# Patient Record
Sex: FEMALE | Race: BLACK OR AFRICAN AMERICAN | NOT HISPANIC OR LATINO | Employment: UNEMPLOYED | URBAN - METROPOLITAN AREA
[De-identification: names, ages, dates, MRNs, and addresses within clinical notes are randomized per-mention and may not be internally consistent; named-entity substitution may affect disease eponyms.]

---

## 2018-08-27 ENCOUNTER — OFFICE VISIT (OUTPATIENT)
Dept: ENDOCRINOLOGY | Facility: CLINIC | Age: 68
End: 2018-08-27

## 2018-08-27 VITALS
HEART RATE: 100 BPM | OXYGEN SATURATION: 98 % | DIASTOLIC BLOOD PRESSURE: 64 MMHG | SYSTOLIC BLOOD PRESSURE: 100 MMHG | WEIGHT: 77.31 LBS

## 2018-08-27 DIAGNOSIS — E11.65 UNCONTROLLED TYPE 2 DIABETES MELLITUS WITH HYPERGLYCEMIA, WITH LONG-TERM CURRENT USE OF INSULIN (HCC): ICD-10-CM

## 2018-08-27 DIAGNOSIS — Z79.4 UNCONTROLLED TYPE 2 DIABETES MELLITUS WITH HYPERGLYCEMIA, WITH LONG-TERM CURRENT USE OF INSULIN (HCC): ICD-10-CM

## 2018-08-27 DIAGNOSIS — E11.65 UNCONTROLLED TYPE 2 DIABETES MELLITUS WITH HYPERGLYCEMIA, WITH LONG-TERM CURRENT USE OF INSULIN (HCC): Primary | ICD-10-CM

## 2018-08-27 DIAGNOSIS — Z79.4 UNCONTROLLED TYPE 2 DIABETES MELLITUS WITH HYPERGLYCEMIA, WITH LONG-TERM CURRENT USE OF INSULIN (HCC): Primary | ICD-10-CM

## 2018-08-27 DIAGNOSIS — K52.9 CHRONIC DIARRHEA: ICD-10-CM

## 2018-08-27 DIAGNOSIS — D64.9 ANEMIA, UNSPECIFIED TYPE: ICD-10-CM

## 2018-08-27 LAB
GLUCOSE BLDC GLUCOMTR-MCNC: 63 MG/DL (ref 70–130)
HBA1C MFR BLD: 10.9 %

## 2018-08-27 PROCEDURE — 83036 HEMOGLOBIN GLYCOSYLATED A1C: CPT | Performed by: PHYSICIAN ASSISTANT

## 2018-08-27 PROCEDURE — 82570 ASSAY OF URINE CREATININE: CPT | Performed by: PHYSICIAN ASSISTANT

## 2018-08-27 PROCEDURE — 82043 UR ALBUMIN QUANTITATIVE: CPT | Performed by: PHYSICIAN ASSISTANT

## 2018-08-27 PROCEDURE — 99204 OFFICE O/P NEW MOD 45 MIN: CPT | Performed by: PHYSICIAN ASSISTANT

## 2018-08-27 PROCEDURE — 82947 ASSAY GLUCOSE BLOOD QUANT: CPT | Performed by: PHYSICIAN ASSISTANT

## 2018-08-27 RX ORDER — LOPERAMIDE HYDROCHLORIDE 2 MG/1
CAPSULE ORAL
Qty: 90 CAPSULE | Refills: 0 | Status: SHIPPED | OUTPATIENT
Start: 2018-08-27

## 2018-08-27 NOTE — PROGRESS NOTES
Chief Complaint  Establish care for Diabetes Mellitus.    HPI   Nadia Jean Baptiste is a 67 y.o. female who is here today for evaluation of Diabetes Mellitus type 2.  The initial diagnosis of diabetes was made 2012.    Presents with daughter today.   Needs new PCP.     A1C- 10.9 (8/27/18), 13.8 (6/29/18)  BS 63 today. Fasting today.   Daily diarrhea after eating. Chronic x 1 year. Takes Imodium. Mag oxide on med list but she's not taking this. Not on metformin. Good appetite. Hx porcelain gallbladder. Elevated LFTs.   Anemic. Denies melena or hematochezia.   Labs reviewed:  6/29/18- Hgb 10.5, Hct 30.6, GFR >60, , , LDL 44, TG 70, TSH 1.86    Diabetic complications: peripheral neuropathy- tingling and numbness in fingers, none in feet  Eye exam current (within one year): due  Foot care and dental care: discussed    Current diabetic medications include:  Novolin R - if morning sugar >500 takes 10U, if over 200 then takes 5U  Toujeo 10U QHS    Statin: no    Past medications: Levemir (hypoglycemia)    Diabetic Monitoring  - checks glucosex/day  Glucose is averaging- per report- morning sugar 300s, pre-lunch 200s, pre-dinner 300-400s  Hypoglycemia- not recently since on toujeo. Had hypoglycemia on levemir  No meter or log for review    Nutrition:     Current diet: on average, 2-3 meals per day. No sugary drinks  Current exercise: none  Seen RD in past: no    The following portions of the patient's history were reviewed and updated by me as appropriate: allergies, current medications, past family history, past social history, past surgical history and problem list.    No past medical history on file.    Medications    Current Outpatient Prescriptions:   •  glucose blood (RELION PRIME TEST) test strip, Use QID, Disp: 150 each, Rfl: 12  •  Insulin Glargine (TOUJEO SOLOSTAR) 300 UNIT/ML solution pen-injector, Inject 20 Units under the skin into the appropriate area as directed every night at bedtime., Disp: 2 pen,  Rfl: 1  •  insulin regular (HUMULIN R) 100 UNIT/ML injection, Take up to 20 units ac BID, Disp: 20 mL, Rfl: 6  •  loperamide (IMODIUM) 2 MG capsule, Take TID prn diarrhea, Disp: 90 capsule, Rfl: 0    Review of Systems  Review of Systems   Constitutional: Positive for unexpected weight change (wt loss). Negative for chills, fatigue and fever.   HENT: Negative for ear pain, hearing loss, nosebleeds, rhinorrhea and sore throat.    Eyes: Negative for pain, discharge, redness, itching and visual disturbance.   Respiratory: Negative for cough, shortness of breath and wheezing.    Cardiovascular: Negative for chest pain, palpitations and leg swelling.   Gastrointestinal: Positive for diarrhea. Negative for abdominal pain, anal bleeding, blood in stool and constipation.   Endocrine: Negative for cold intolerance, heat intolerance and polydipsia.   Genitourinary: Negative for dysuria, frequency, hematuria, pelvic pain, vaginal bleeding and vaginal discharge.   Musculoskeletal: Negative for arthralgias, gait problem, joint swelling and myalgias.   Skin: Negative for rash.   Allergic/Immunologic: Negative.    Neurological: Negative for dizziness, syncope, weakness, numbness and headaches.   Hematological: Negative for adenopathy. Does not bruise/bleed easily.   Psychiatric/Behavioral: Negative for sleep disturbance and suicidal ideas. The patient is not nervous/anxious.         Physical Exam    /64   Pulse 100   Wt 35.1 kg (77 lb 5 oz)   SpO2 98% There is no height or weight on file to calculate BMI.  Physical Exam   Constitutional: She is oriented to person, place, and time. She appears well-developed. No distress.   HENT:   Head: Normocephalic.   Right Ear: External ear normal.   Left Ear: External ear normal.   Mouth/Throat: No oropharyngeal exudate.   Eyes: Conjunctivae and lids are normal. Right eye exhibits no discharge. Left eye exhibits no discharge. Right pupil is reactive. Left pupil is reactive.   Neck: No  JVD present. No tracheal deviation present. No thyroid mass and no thyromegaly present.   Cardiovascular: Normal rate, regular rhythm, normal heart sounds and intact distal pulses.    No murmur heard.  Pulmonary/Chest: Effort normal and breath sounds normal. No respiratory distress. She has no wheezes.   Abdominal: Soft. Bowel sounds are normal. There is no tenderness.   Musculoskeletal: She exhibits no edema or tenderness.    Nadia had a diabetic foot exam performed today.   During the foot exam she had a monofilament test performed.  Vascular Status -  Her right foot exhibits normal foot vasculature  and no edema. Her left foot exhibits normal foot vasculature  and no edema.  Skin Integrity  -  Her right foot skin is intact.Her left foot skin is intact..  Lymphadenopathy:     She has no cervical adenopathy.   Neurological: She is alert and oriented to person, place, and time.   Skin: Skin is warm, dry and intact. No rash noted. She is not diaphoretic. No erythema.   Psychiatric: She has a normal mood and affect. Her speech is normal and behavior is normal. Thought content normal.       Labs and Imaging   No results found for: HGBA1C  No visits with results within 1 Month(s) from this visit.   Latest known visit with results is:   No results found for any previous visit.     No images are attached to the encounter or orders placed in the encounter.  No Images in the past 120 days found..    Assessment / Plan   Nadia was seen today for establish care.    Diagnoses and all orders for this visit:    Uncontrolled type 2 diabetes mellitus with hyperglycemia, with long-term current use of insulin (CMS/[facility])  -     POC Glucose Fingerstick  -     POC Glycosylated Hemoglobin (Hb A1C)  -     Microalbumin / Creatinine Urine Ratio - Urine, Clean Catch  -     Ambulatory Referral to Podiatry  -     insulin regular (HUMULIN R) 100 UNIT/ML injection; Take up to 20 units ac BID  -     Insulin Glargine (TOUJEO SOLOSTAR) 300 UNIT/ML  solution pen-injector; Inject 20 Units under the skin into the appropriate area as directed every night at bedtime.  -     glucose blood (RELION PRIME TEST) test strip; Use QID    Chronic diarrhea  -     Ambulatory Referral to Gastroenterology  -     loperamide (IMODIUM) 2 MG capsule; Take TID prn diarrhea    Anemia, unspecified type        Diabetes Mellitus 2 is under poor control.  -A1c 10.9, down from 13.8 6/2018  -she has anemia- falsely lowers A1C  -insulin adjusted- increase toujeo to 12U QHS. Increase by 2U q3days up to 20 units.  -novolin R- 5 units ac breakfast, 10U ac dinner. CF 1:50>150. Daughter administers the insulin but not there during lunch to give her insulin. Consider premixed insulin.   -written instructions reviewed with daughter  -asked to check BS ac and qhs- bring meter or log to f/u  -mild hypoglycemia today treated with PB crackers- repeat BS at the end of visit- 96  -encouraged to have eye exam     1.  Diet: 3-4 carb servings per meal for females, 4-5 carb servings per meal for males  Spread carb intake throughout the day  Increase lean protein and vegetable intake  Avoid sugary drinks and processed carbs including crackers, cookies, cakes  2.  Exercise: Recommend at least 30 minutes of exercise daily, at least 5 days per week. Increase exercise gradually.   3.  Blood Glucose Goal: Blood glucose goal <150 fasting, <180 2 hr postprandial  4.  Microalbumin due  5.  Education performed during this visit: long term diabetic complications discussed. , annual eye examinations at Ophthalmology discussed, dental hygiene discussed  and foot care reviewed., home glucose monitoring emphasized, all medications, side effects and compliance discussed carefully and Hypoglycemia management and prevention reviewed. Reviewed ‘ABCs’ of diabetes management (respective goals in parentheses):  A1C (<7), blood pressure (<130/80), and cholesterol (LDL <100, if CVD <70).    Chronic diarrhea  -referral to GI  -hx  porcelain gallbladder with elevated LFTs  -refills imodium x 30 days per pt request    Anemia  -falsely lowers A!c  -she denies melena or hematochezia  -she's likely not absorbing nutrients with chronic diarrhea    Patient Instructions     Insulin dosing:  Basal insulin - increase Toujeo to 12 units at bedtime. Increase by 2 units every 3 days, up to 20 units.       Meal Insulin-  Take Novolin R take 5 units before breakfast, 10 units before dinner. Take the insulin 30 minutes before meals. You can add extra insulin based on pre-meal sugar and chart below.          Correction insulin (add to meal insulin)   0 unit  if  Blood sugar (BS)  less than 150   1 unit   if BS  150 - 199   2 units if  - 249   3 units if  - 299   4 units if  - 349   5 units if -399   6 units if BS >400     Check sugar before each meal and at bedtime. Bring meter or blood sugar log to next visit.         Follow up: Return in about 6 weeks (around 10/8/2018).    Discussed the nature of the disease including, risks, complications, implications, management, safe and proper use of medications. Encouraged therapeutic lifestyle changes including low calorie diet with plenty of fruits and vegetables, daily exercise, medication compliance, and keeping scheduled follow up appointments with me and any other providers. Encouraged patient to have appointment for complete physical, fasting labs, appropriate screenings, and immunizations on an annual basis.    30 min  of 45 min face-to-face visit time spent for coordination of care and counselling regarding identified problems as outlined in the objective, assessment and discussion portions of the documentation.    Nba Feldman PA-C

## 2018-08-27 NOTE — TELEPHONE ENCOUNTER
PHARMACY STATES WE SENT THIS PRESCRIPTION FOR 2 PEN NEEDLES. THEY CANT SPLIT THE BOX WITH THIS MEDICATION AND IT COMES WITH 3 IN A BOX. THEY NEED APPROVAL TO GIVE PATIENT 3 PEN NEEDLES INSTEAD OF 2. PHARMACY NUMBER -[phone]

## 2018-08-27 NOTE — PATIENT INSTRUCTIONS
Insulin dosing:  Basal insulin - increase Toujeo to 12 units at bedtime. Increase by 2 units every 3 days, up to 20 units.       Meal Insulin-  Take Novolin R take 5 units before breakfast, 10 units before dinner. Take the insulin 30 minutes before meals. You can add extra insulin based on pre-meal sugar and chart below.          Correction insulin (add to meal insulin)   0 unit  if  Blood sugar (BS)  less than 150   1 unit   if BS  150 - 199   2 units if  - 249   3 units if  - 299   4 units if  - 349   5 units if -399   6 units if BS >400     Check sugar before each meal and at bedtime. Bring meter or blood sugar log to next visit.

## 2018-08-28 LAB
CREAT 24H UR-MCNC: 80 MG/DL
MICROALBUMIN UR-MCNC: 149.3 UG/ML
MICROALBUMIN/CREAT UR: 186.6 MG/G CREAT (ref 0–30)

## 2018-08-29 ENCOUNTER — PRIOR AUTHORIZATION (OUTPATIENT)
Dept: ENDOCRINOLOGY | Facility: CLINIC | Age: 68
End: 2018-08-29

## 2018-10-08 ENCOUNTER — TELEPHONE (OUTPATIENT)
Dept: ENDOCRINOLOGY | Facility: CLINIC | Age: 68
End: 2018-10-08

## 2018-10-08 NOTE — TELEPHONE ENCOUNTER
Spoke with pharmacy and they stated that she had not been able to get the Humulin R filled but that she was able to get  Novolin R filled

## 2018-10-10 ENCOUNTER — PRIOR AUTHORIZATION (OUTPATIENT)
Dept: INTERNAL MEDICINE | Facility: CLINIC | Age: 68
End: 2018-10-10

## 2018-10-10 NOTE — TELEPHONE ENCOUNTER
Spoke with pharmacy regarding pt's insulin since I go tno response from the PA, they stated that she had been getting Novolin R filled.

## 2018-10-26 DIAGNOSIS — Z79.4 UNCONTROLLED TYPE 2 DIABETES MELLITUS WITH HYPERGLYCEMIA, WITH LONG-TERM CURRENT USE OF INSULIN (HCC): ICD-10-CM

## 2018-10-26 DIAGNOSIS — E11.65 UNCONTROLLED TYPE 2 DIABETES MELLITUS WITH HYPERGLYCEMIA, WITH LONG-TERM CURRENT USE OF INSULIN (HCC): ICD-10-CM

## 2018-10-26 NOTE — TELEPHONE ENCOUNTER
PATIENT'S DAUGHTER CALLED TO REQUEST TO HAVE RX FOR INSULIN SENT TO [facility] IN [place], [place]. PATIENT IS OUT OF TOWN AND FAILED TO BRING INSULIN WITH HER. SHE WILL ALSO NEED SYRINGES AND PEN NEEDLES AS WELL. DAUGHTER WILL BUY A NEW METER AT [facility]. DAUGHTER WILL SCHEDULE A F/U APPT WITH [facility] ONCE THEY ARE BACK IN TOWN. CORRECT PHARMACY HAS BEEN ADDED TO HER CHART.    CALL BACK [phone] DAUGHTER